# Patient Record
Sex: FEMALE | Race: WHITE | NOT HISPANIC OR LATINO | Employment: STUDENT | URBAN - NONMETROPOLITAN AREA
[De-identification: names, ages, dates, MRNs, and addresses within clinical notes are randomized per-mention and may not be internally consistent; named-entity substitution may affect disease eponyms.]

---

## 2023-05-03 PROBLEM — M54.50 LOW BACK PAIN: Status: ACTIVE | Noted: 2023-05-03

## 2023-05-03 PROBLEM — M41.9 SCOLIOSIS: Status: ACTIVE | Noted: 2023-05-03

## 2023-05-03 PROBLEM — F45.21 HYPOCHONDRIASIS: Status: ACTIVE | Noted: 2023-05-03

## 2023-05-03 PROBLEM — N92.0 MENORRHAGIA: Status: ACTIVE | Noted: 2023-05-03

## 2023-05-03 PROBLEM — G47.00 INSOMNIA: Status: ACTIVE | Noted: 2023-05-03

## 2023-05-03 PROBLEM — H10.9 CONJUNCTIVITIS: Status: ACTIVE | Noted: 2023-05-03

## 2023-05-03 PROBLEM — M94.0 COSTOCHONDRITIS: Status: ACTIVE | Noted: 2023-05-03

## 2023-05-03 PROBLEM — F41.9 ANXIETY: Status: ACTIVE | Noted: 2023-05-03

## 2023-09-07 DIAGNOSIS — N92.0 MENORRHAGIA WITH REGULAR CYCLE: Primary | ICD-10-CM

## 2023-09-08 RX ORDER — NORETHINDRONE ACETATE AND ETHINYL ESTRADIOL 1MG-20(24)
1 KIT ORAL DAILY
Qty: 84 TABLET | Refills: 3 | Status: SHIPPED | OUTPATIENT
Start: 2023-09-08 | End: 2023-09-11 | Stop reason: ALTCHOICE

## 2023-09-11 ENCOUNTER — DOCUMENTATION (OUTPATIENT)
Dept: PRIMARY CARE | Facility: CLINIC | Age: 23
End: 2023-09-11
Payer: COMMERCIAL

## 2023-09-11 ENCOUNTER — TELEPHONE (OUTPATIENT)
Dept: PRIMARY CARE | Facility: CLINIC | Age: 23
End: 2023-09-11

## 2023-09-11 NOTE — TELEPHONE ENCOUNTER
Result Communication    No results found from the In Basket message.    11:50 AM      Results {WERE / WERE NOT:38037} successfully communicated with the {RHE[employer] PARENT/PATIENT:[id number]} and they {AMB Acknowledged/Did Not Acknowledge:95612} their understanding.

## 2023-09-11 NOTE — TELEPHONE ENCOUNTER
Patient calling in today and states that she got a call last week about her birth control refill but wanted to call and let us know that she is no longer taking that birth control anymore.

## 2025-07-22 ENCOUNTER — APPOINTMENT (OUTPATIENT)
Dept: PRIMARY CARE | Facility: CLINIC | Age: 25
End: 2025-07-22
Payer: COMMERCIAL

## 2025-07-22 PROBLEM — M79.673 PAIN OF FOOT: Status: ACTIVE | Noted: 2025-07-22

## 2025-07-22 PROBLEM — M48.062 PSEUDOCLAUDICATION SYNDROME: Status: ACTIVE | Noted: 2025-07-22

## 2025-12-29 ENCOUNTER — APPOINTMENT (OUTPATIENT)
Dept: PRIMARY CARE | Facility: CLINIC | Age: 25
End: 2025-12-29
Payer: COMMERCIAL